# Patient Record
Sex: MALE | Race: WHITE | NOT HISPANIC OR LATINO | Employment: OTHER | URBAN - METROPOLITAN AREA
[De-identification: names, ages, dates, MRNs, and addresses within clinical notes are randomized per-mention and may not be internally consistent; named-entity substitution may affect disease eponyms.]

---

## 2017-01-16 ENCOUNTER — HOSPITAL ENCOUNTER (EMERGENCY)
Facility: HOSPITAL | Age: 72
Discharge: HOME | End: 2017-01-16
Attending: EMERGENCY MEDICINE | Admitting: EMERGENCY MEDICINE

## 2017-01-16 ENCOUNTER — APPOINTMENT (OUTPATIENT)
Dept: CT IMAGING | Facility: HOSPITAL | Age: 72
End: 2017-01-16

## 2017-01-16 VITALS
WEIGHT: 250 LBS | BODY MASS INDEX: 32.08 KG/M2 | HEIGHT: 74 IN | SYSTOLIC BLOOD PRESSURE: 152 MMHG | HEART RATE: 62 BPM | OXYGEN SATURATION: 99 % | DIASTOLIC BLOOD PRESSURE: 85 MMHG | TEMPERATURE: 96.6 F | RESPIRATION RATE: 16 BRPM

## 2017-01-16 DIAGNOSIS — D64.9 CHRONIC ANEMIA: ICD-10-CM

## 2017-01-16 DIAGNOSIS — R55 VASOVAGAL SYNCOPE: Primary | ICD-10-CM

## 2017-01-16 LAB
ALBUMIN SERPL-MCNC: 3.4 G/DL (ref 3.5–5.2)
ALBUMIN/GLOB SERPL: 1.4 G/DL
ALP SERPL-CCNC: 71 U/L (ref 39–117)
ALT SERPL W P-5'-P-CCNC: 18 U/L (ref 1–41)
ANION GAP SERPL CALCULATED.3IONS-SCNC: 12 MMOL/L
AST SERPL-CCNC: 16 U/L (ref 1–40)
BASOPHILS # BLD AUTO: 0.01 10*3/MM3 (ref 0–0.2)
BASOPHILS NFR BLD AUTO: 0.2 % (ref 0–1.5)
BILIRUB SERPL-MCNC: 0.7 MG/DL (ref 0.1–1.2)
BUN BLD-MCNC: 27 MG/DL (ref 8–23)
BUN/CREAT SERPL: 22.9 (ref 7–25)
CALCIUM SPEC-SCNC: 9 MG/DL (ref 8.6–10.5)
CHLORIDE SERPL-SCNC: 105 MMOL/L (ref 98–107)
CO2 SERPL-SCNC: 24 MMOL/L (ref 22–29)
CREAT BLD-MCNC: 1.18 MG/DL (ref 0.76–1.27)
DEPRECATED RDW RBC AUTO: 53.3 FL (ref 37–54)
EOSINOPHIL # BLD AUTO: 0.03 10*3/MM3 (ref 0–0.7)
EOSINOPHIL NFR BLD AUTO: 0.7 % (ref 0.3–6.2)
ERYTHROCYTE [DISTWIDTH] IN BLOOD BY AUTOMATED COUNT: 16.1 % (ref 11.5–14.5)
GFR SERPL CREATININE-BSD FRML MDRD: 61 ML/MIN/1.73
GLOBULIN UR ELPH-MCNC: 2.4 GM/DL
GLUCOSE BLD-MCNC: 178 MG/DL (ref 65–99)
HCT VFR BLD AUTO: 31.8 % (ref 40.4–52.2)
HGB BLD-MCNC: 9.6 G/DL (ref 13.7–17.6)
IMM GRANULOCYTES # BLD: 0 10*3/MM3 (ref 0–0.03)
IMM GRANULOCYTES NFR BLD: 0 % (ref 0–0.5)
LYMPHOCYTES # BLD AUTO: 0.68 10*3/MM3 (ref 0.9–4.8)
LYMPHOCYTES NFR BLD AUTO: 16.7 % (ref 19.6–45.3)
MCH RBC QN AUTO: 28.7 PG (ref 27–32.7)
MCHC RBC AUTO-ENTMCNC: 30.2 G/DL (ref 32.6–36.4)
MCV RBC AUTO: 95.2 FL (ref 79.8–96.2)
MONOCYTES # BLD AUTO: 0.15 10*3/MM3 (ref 0.2–1.2)
MONOCYTES NFR BLD AUTO: 3.7 % (ref 5–12)
NEUTROPHILS # BLD AUTO: 3.21 10*3/MM3 (ref 1.9–8.1)
NEUTROPHILS NFR BLD AUTO: 78.7 % (ref 42.7–76)
NRBC BLD MANUAL-RTO: 0 /100 WBC (ref 0–0)
PLATELET # BLD AUTO: 72 10*3/MM3 (ref 140–500)
PMV BLD AUTO: 10.2 FL (ref 6–12)
POTASSIUM BLD-SCNC: 4.5 MMOL/L (ref 3.5–5.2)
PROT SERPL-MCNC: 5.8 G/DL (ref 6–8.5)
RBC # BLD AUTO: 3.34 10*6/MM3 (ref 4.6–6)
SODIUM BLD-SCNC: 141 MMOL/L (ref 136–145)
TROPONIN T SERPL-MCNC: <0.01 NG/ML (ref 0–0.03)
TROPONIN T SERPL-MCNC: <0.01 NG/ML (ref 0–0.03)
WBC NRBC COR # BLD: 4.08 10*3/MM3 (ref 4.5–10.7)

## 2017-01-16 PROCEDURE — 70450 CT HEAD/BRAIN W/O DYE: CPT

## 2017-01-16 PROCEDURE — 84484 ASSAY OF TROPONIN QUANT: CPT | Performed by: EMERGENCY MEDICINE

## 2017-01-16 PROCEDURE — 93005 ELECTROCARDIOGRAM TRACING: CPT | Performed by: EMERGENCY MEDICINE

## 2017-01-16 PROCEDURE — 93010 ELECTROCARDIOGRAM REPORT: CPT | Performed by: INTERNAL MEDICINE

## 2017-01-16 PROCEDURE — 80053 COMPREHEN METABOLIC PANEL: CPT | Performed by: EMERGENCY MEDICINE

## 2017-01-16 PROCEDURE — 99285 EMERGENCY DEPT VISIT HI MDM: CPT

## 2017-01-16 PROCEDURE — 85025 COMPLETE CBC W/AUTO DIFF WBC: CPT | Performed by: EMERGENCY MEDICINE

## 2017-01-16 RX ORDER — SODIUM CHLORIDE 0.9 % (FLUSH) 0.9 %
10 SYRINGE (ML) INJECTION AS NEEDED
Status: DISCONTINUED | OUTPATIENT
Start: 2017-01-16 | End: 2017-01-16 | Stop reason: HOSPADM

## 2017-01-16 NOTE — DISCHARGE INSTRUCTIONS
Follow-up with Dr. Guerrero later this week.  Drink plenty of fluids.  Return to the emergency department for chest pain, shortness breath, fainting, dizziness, or other concern.

## 2017-01-16 NOTE — ED PROVIDER NOTES
EMERGENCY DEPARTMENT ENCOUNTER    CHIEF COMPLAINT  Chief Complaint: Near Syncope  History given by: Patient  History limited by: Nothing  Room Number: 17/17  PMD: Barry Guerrero MD      HPI:  Pt is a 71 y.o. male who presents s/p two near syncopal episodes that occurred about 1100 at [facility]. He states that he had just finished eating and when he went to stand, he suddenly became very dizzy. The patient immediately sat down and took a nitro. 5 minutes later the patient went to leave and he became dizzy again and his vision blurred before he sat back down. He noticed he was very hot and diaphoretic during the second episode. The patient denies chest pain, SOA, pain in his arms or palpitations prior to the near syncopal episode. Patient has also had a productive cough for the past week. He is currently taking an antibiotic, although he has not changed any of his other medications. No other complaints at this time.      Duration:  5-10 minutes  Onset: Sudden  Timing: Episodic  Location: Generalized  Radiation: None  Quality: Weakness  Intensity/Severity: Moderate  Progression: Resolved  Associated Symptoms: Syncope, dizziness, diaphoretic, visual disturbance, cough  Aggravating Factors: None  Alleviating Factors: Time  Previous Episodes: None  Treatment before arrival: Nitro     PAST MEDICAL HISTORY  Active Ambulatory Problems     Diagnosis Date Noted   • Cellulitis of right lower extremity 09/09/2016   • Osteomyelitis 12/08/2016     Resolved Ambulatory Problems     Diagnosis Date Noted   • No Resolved Ambulatory Problems     Past Medical History   Diagnosis Date   • Coronary artery disease    • Diabetes mellitus    • GERD (gastroesophageal reflux disease)    • H/O kidney transplant    • Hyperlipidemia    • Renal failure    • Sleep apnea        PAST SURGICAL HISTORY  Past Surgical History   Procedure Laterality Date   • Coronary angioplasty with stent placement  09/28/2011   • Transplantation renal  11/16/1990   •  Joint replacement  12/2000     hip,    • Nasal sinus surgery     • Amputation foot / toe       second toe on both feet   • Shoulder rotator cuff repair     • Hernia repair       embilical   • Av fistula placement       right arm, removed 10 years ago.   • Pr drain lower leg deep absc/hematoma Right 9/9/2016     Procedure: LOWER EXTREMITY DEBRIDEMENT-ulcer is on bottom of right foot;  Surgeon: Lexis Burgess DO;  Location: [facility] OR;  Service: General   • Amputation digit Right 12/9/2016     Procedure: RESECTION 2ND THROUGH 5TH METATARSAL;  Surgeon: Marin Castañeda DPM;  Location: [facility] OR;  Service:    • Bone excision leg Right 12/14/2016     Procedure: RESECTION OF PARTIAL FIRST RAY RIGHT FOOT WITH SURGICAL WOUND CLOSURE;  Surgeon: Marin Castañeda DPM;  Location: [facility] OR;  Service:        FAMILY HISTORY  Family History   Problem Relation Age of Onset   • Heart disease Mother    • Heart disease Father    • Cancer Brother        SOCIAL HISTORY  Social History     Social History   • Marital status:      Spouse name: N/A   • Number of children: N/A   • Years of education: N/A     Occupational History   • Not on file.     Social History Main Topics   • Smoking status: Former Smoker   • Smokeless tobacco: Never Used      Comment: Quit 30 years ago   • Alcohol use No   • Drug use: No   • Sexual activity: Defer     Other Topics Concern   • Not on file     Social History Narrative   • No narrative on file       ALLERGIES  Sulfa antibiotics and Adhesive tape    REVIEW OF SYSTEMS  Review of Systems   Constitutional: Positive for diaphoresis. Negative for chills and fatigue.   HENT: Negative for congestion, rhinorrhea and sore throat.    Eyes: Positive for visual disturbance. Negative for pain.   Respiratory: Positive for cough. Negative for shortness of breath and wheezing.    Cardiovascular: Negative for chest pain, palpitations and leg swelling.   Gastrointestinal: Negative for abdominal pain, diarrhea,  nausea and vomiting.   Genitourinary: Negative for difficulty urinating, dysuria, flank pain and frequency.   Musculoskeletal: Negative for arthralgias, myalgias, neck pain and neck stiffness.   Skin: Negative for rash.   Neurological: Positive for syncope (Near) and weakness. Negative for dizziness, speech difficulty, light-headedness, numbness and headaches.   Psychiatric/Behavioral: Negative.    All other systems reviewed and are negative.      PHYSICAL EXAM  ED Triage Vitals   Temp Heart Rate Resp BP SpO2   01/16/17 1206 01/16/17 1206 01/16/17 1206 01/16/17 1206 01/16/17 1206   97.8 °F (36.6 °C) 67 16 109/65 98 %      Temp src Heart Rate Source Patient Position BP Location FiO2 (%)   -- -- -- -- --              Physical Exam   Constitutional: He is oriented to person, place, and time and well-developed, well-nourished, and in no distress.   HENT:   Head: Normocephalic and atraumatic.   Eyes: EOM are normal. Pupils are equal, round, and reactive to light.   Neck: Normal range of motion. Neck supple.   Cardiovascular: Normal rate, regular rhythm and normal heart sounds.    Pulmonary/Chest: Effort normal and breath sounds normal. No respiratory distress.   Abdominal: Soft. There is no tenderness. There is no rebound and no guarding.   Musculoskeletal: Normal range of motion. He exhibits no edema.   Neurological: He is alert and oriented to person, place, and time. He has normal sensation and normal strength.   Skin: Skin is warm and dry.   1+ edema ankles   Psychiatric: Mood and affect normal.   Nursing note and vitals reviewed.      LAB RESULTS  Lab Results (last 24 hours)     Procedure Component Value Units Date/Time    CBC & Differential [[id number]] Collected:  01/16/17 1310    Specimen:  Blood Updated:  01/16/17 1323    Narrative:       The following orders were created for panel order CBC & Differential.  Procedure                               Abnormality         Status                     ---------                                -----------         ------                     CBC Auto Differential[[id number]]         Abnormal            Final result                 Please view results for these tests on the individual orders.    Comprehensive Metabolic Panel [[id number]]  (Abnormal) Collected:  01/16/17 1310    Specimen:  Blood Updated:  01/16/17 1344     Glucose 178 (H) mg/dL      BUN 27 (H) mg/dL      Creatinine 1.18 mg/dL      Sodium 141 mmol/L      Potassium 4.5 mmol/L      Chloride 105 mmol/L      CO2 24.0 mmol/L      Calcium 9.0 mg/dL      Total Protein 5.8 (L) g/dL      Albumin 3.40 (L) g/dL      ALT (SGPT) 18 U/L      AST (SGOT) 16 U/L      Alkaline Phosphatase 71 U/L      Total Bilirubin 0.7 mg/dL      eGFR Non African Amer 61 mL/min/1.73      Globulin 2.4 gm/dL      A/G Ratio 1.4 g/dL      BUN/Creatinine Ratio 22.9      Anion Gap 12.0 mmol/L     Narrative:       The MDRD GFR formula is only valid for adults with stable renal function between ages 18 and 70.    Troponin [[id number]]  (Normal) Collected:  01/16/17 1310    Specimen:  Blood Updated:  01/16/17 1344     Troponin T <0.010 ng/mL     Narrative:       Troponin T Reference Ranges:  Less than 0.03 ng/mL:    Negative for AMI  0.03 to 0.09 ng/mL:      Indeterminant for AMI  Greater than 0.09 ng/mL: Positive for AMI    CBC Auto Differential [[id number]]  (Abnormal) Collected:  01/16/17 1310    Specimen:  Blood Updated:  01/16/17 1323     WBC 4.08 (L) 10*3/mm3      RBC 3.34 (L) 10*6/mm3      Hemoglobin 9.6 (L) g/dL      Hematocrit 31.8 (L) %      MCV 95.2 fL      MCH 28.7 pg      MCHC 30.2 (L) g/dL      RDW 16.1 (H) %      RDW-SD 53.3 fl      MPV 10.2 fL      Platelets 72 (L) 10*3/mm3      Neutrophil % 78.7 (H) %      Lymphocyte % 16.7 (L) %      Monocyte % 3.7 (L) %      Eosinophil % 0.7 %      Basophil % 0.2 %      Immature Grans % 0.0 %      Neutrophils, Absolute 3.21 10*3/mm3      Lymphocytes, Absolute 0.68 (L) 10*3/mm3      Monocytes, Absolute 0.15 (L) 10*3/mm3       Eosinophils, Absolute 0.03 10*3/mm3      Basophils, Absolute 0.01 10*3/mm3      Immature Grans, Absolute 0.00 10*3/mm3      nRBC 0.0 /100 WBC     Troponin [[id number]]  (Normal) Collected:  01/16/17 1505    Specimen:  Blood Updated:  01/16/17 1539     Troponin T <0.010 ng/mL     Narrative:       Troponin T Reference Ranges:  Less than 0.03 ng/mL:    Negative for AMI  0.03 to 0.09 ng/mL:      Indeterminant for AMI  Greater than 0.09 ng/mL: Positive for AMI          I ordered the above labs and reviewed the results    RADIOLOGY  CT Head Without Contrast   Preliminary Result   No convincing acute abnormality is seen. 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 The remainder of the head CT is   within normal limits. If there is persistent vertigo and dizziness and   one is concerned about the possibility of an acute posterior fossa   stroke, an MRI of the head could be obtained for more complete   assessment.       The results were communicated to Dr. Maxim Abreu in the emergency room   by telephone 01/16/2017 at 1:45 PM.                     1351  Reviewed CT head - Nothing acute. 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 Independently viewed by me. Interpreted by radiologist. Discussed with .    I ordered the above noted radiological studies. Interpreted by radiologist. Discussed with radiologist (). Reviewed by me in PACS.       PROCEDURES  Procedures    EKG           EKG time: 1242  Rhythm/Rate: SR 60  P waves and NM: Normal  QRS, axis: RAD, inferior Q waves   ST and T waves: Normal     Interpreted Contemporaneously by me, independently viewed  Unchanged compared to prior 12/8/16    PROGRESS AND CONSULTS  ED Course   Value Comment By Time   Hemoglobin: (!) 9.6 11.3 one month ago Marin Abreu MD 01/16 1351     1226  Ordered EKG for further evaluation    1253  Ordered Labs  and CT Head for further evaluation. Also ordered IVF for hydration.     1411  Orthostatics are negative.     1417  Rechecked patient and they are resting comfortably. Discussed pertinent lab and imaging results, including negative acute CT, EKG and labs. Discussed the plan to consult with his Cardiologist. Patient agrees with plan and all questions were addressed.     1419  Discussed case with  (Cardiology). He recommended running a repeat troponin. Reviewed history, exam, results and treatments.  Discussed concerns and plan of care.     1437  Rechecked patient and they are resting. Discussed pertinent lab results, including slightly decreased hemoglobin. Discussed the plan to recheck his troponin at 1500. Patient agrees with plan and all questions were addressed.     1540  Repeat troponin was negative.  Patient has ambulated in the ER without difficulty.  He is resting comfortable and has no complaints.  Patient will be discharged.      MEDICAL DECISION MAKING  Results were reviewed/discussed with the patient and they were also made aware of online access. Pt also made aware that some labs, such as cultures, will not be resulted during ER visit and follow up with PMD is necessary.     MDM  Number of Diagnoses or Management Options  Chronic anemia:   Vasovagal syncope:   Diagnosis management comments: Patient was in the ER after having a near-syncopal episode.  Patient had just finished eating a [employer] when he stood up and became dizzy.  He then sat back down into the nitroglycerin.  He denies having any chest pain, shortness of breath, nausea.  After few minutes, instead backup and again felt lightheaded and faint.  He states his vision began to fade out.  He again sat back down.  He never lost consciousness.  Upon arrival to the ER, he complained of mild lightheadedness.  His EKG was unchanged.  His neuro exam was normal.  Head CT showed an old right sided infarct but nothing acute.  Patient is  chronically anemic and his hemoglobin was slightly lower today.  Troponin was negative ×2.  Case was discussed with Dr. Gomez.  The patient was not orthostatic.  He will be discharged and advised follow-up with his regular doctor.  Patient was able ambulate in the ER without difficulty.       Amount and/or Complexity of Data Reviewed  Clinical lab tests: ordered and reviewed  Tests in the radiology section of CPT®: ordered and reviewed  Tests in the medicine section of CPT®: ordered and reviewed  Discussion of test results with the performing providers: yes  Decide to obtain previous medical records or to obtain history from someone other than the patient: yes  Review and summarize past medical records: yes (Admitted [facility] 12/11 - 12-22 for right foot cellulitis, osteomyelitis and right foot ulcer. Underwent resection of part first ray of right foot. H/o rental transplant. )           DIAGNOSIS  Final diagnoses:   Vasovagal syncope   Chronic anemia       DISPOSITION  discharged    Latest Documented Vital Signs:  As of 3:42 PM  BP- 152/77 HR- 64 Temp- 97.8 °F (36.6 °C) O2 sat- 99%    --  Documentation assistance provided by shruthi Sharif for .  Information recorded by the scribarnav was done at my direction and has been verified and validated by me.            Adin Sharif  01/16/17 7811       Marin Abreu MD  01/16/17 3805

## 2017-01-16 NOTE — ED NOTES
Pt had near syncopal event at [facility]. Pt dev like visual field was narrowing in and he became diaphoretic. Symptoms started when he went from sitting to standing. Pt took 1 nitro and symptoms went away. Pt is asymptomatic at this time.     Julisa Altamirano RN  01/16/17 3805

## 2017-03-13 DIAGNOSIS — M25.571 ACUTE RIGHT ANKLE PAIN: Primary | ICD-10-CM

## 2017-03-14 ENCOUNTER — HOSPITAL ENCOUNTER (OUTPATIENT)
Dept: GENERAL RADIOLOGY | Facility: HOSPITAL | Age: 72
Discharge: HOME | End: 2017-03-14
Attending: PODIATRIST | Admitting: PODIATRIST

## 2017-03-14 PROCEDURE — 73610 X-RAY EXAM OF ANKLE: CPT

## 2017-09-22 ENCOUNTER — TRANSCRIBE ORDERS (OUTPATIENT)
Dept: ADMINISTRATIVE | Facility: HOSPITAL | Age: 72
End: 2017-09-22

## 2017-09-22 ENCOUNTER — HOSPITAL ENCOUNTER (OUTPATIENT)
Dept: GENERAL RADIOLOGY | Facility: HOSPITAL | Age: 72
Discharge: HOME | End: 2017-09-22
Attending: PODIATRIST | Admitting: PODIATRIST

## 2017-09-22 DIAGNOSIS — L97.521 ULCER OF TOE OF LEFT FOOT, LIMITED TO BREAKDOWN OF SKIN (HCC): Primary | ICD-10-CM

## 2017-09-22 DIAGNOSIS — L97.521 ULCER OF TOE OF LEFT FOOT, LIMITED TO BREAKDOWN OF SKIN (HCC): ICD-10-CM

## 2017-09-22 PROCEDURE — 73630 X-RAY EXAM OF FOOT: CPT

## 2018-02-26 ENCOUNTER — HOSPITAL ENCOUNTER (EMERGENCY)
Facility: HOSPITAL | Age: 73
Discharge: HOME | End: 2018-02-26
Attending: EMERGENCY MEDICINE | Admitting: EMERGENCY MEDICINE

## 2018-02-26 VITALS
HEART RATE: 82 BPM | WEIGHT: 260 LBS | DIASTOLIC BLOOD PRESSURE: 78 MMHG | HEIGHT: 74 IN | RESPIRATION RATE: 20 BRPM | SYSTOLIC BLOOD PRESSURE: 136 MMHG | TEMPERATURE: 97.9 F | OXYGEN SATURATION: 98 % | BODY MASS INDEX: 33.37 KG/M2

## 2018-02-26 DIAGNOSIS — S80.12XA TRAUMATIC HEMATOMA OF LEFT LOWER LEG, INITIAL ENCOUNTER: Primary | ICD-10-CM

## 2018-02-26 DIAGNOSIS — S80.812A ABRASION, LEFT LOWER LEG, INITIAL ENCOUNTER: ICD-10-CM

## 2018-02-26 PROCEDURE — 25010000002 TDAP 5-2.5-18.5 LF-MCG/0.5 SUSPENSION: Performed by: EMERGENCY MEDICINE

## 2018-02-26 PROCEDURE — 99283 EMERGENCY DEPT VISIT LOW MDM: CPT

## 2018-02-26 PROCEDURE — 90715 TDAP VACCINE 7 YRS/> IM: CPT | Performed by: EMERGENCY MEDICINE

## 2018-02-26 PROCEDURE — 99282 EMERGENCY DEPT VISIT SF MDM: CPT | Performed by: EMERGENCY MEDICINE

## 2018-02-26 PROCEDURE — 90471 IMMUNIZATION ADMIN: CPT | Performed by: EMERGENCY MEDICINE

## 2018-02-26 RX ORDER — ATORVASTATIN CALCIUM 10 MG/1
10 TABLET, FILM COATED ORAL DAILY
COMMUNITY

## 2018-02-26 RX ADMIN — MUPIROCIN 1 APPLICATION: 20 OINTMENT TOPICAL at 20:09

## 2018-02-26 RX ADMIN — TETANUS TOXOID, REDUCED DIPHTHERIA TOXOID AND ACELLULAR PERTUSSIS VACCINE, ADSORBED 0.5 ML: 5; 2.5; 8; 8; 2.5 SUSPENSION INTRAMUSCULAR at 20:17

## 2019-01-12 ENCOUNTER — LAB REQUISITION (OUTPATIENT)
Dept: LAB | Facility: HOSPITAL | Age: 74
End: 2019-01-12

## 2019-01-12 DIAGNOSIS — T83.512A: ICD-10-CM

## 2019-01-12 DIAGNOSIS — A41.51 SEPSIS DUE TO ESCHERICHIA COLI (E. COLI) (HCC): ICD-10-CM

## 2019-01-12 LAB
ALBUMIN SERPL-MCNC: 3.4 G/DL (ref 3.5–5.2)
ALBUMIN/GLOB SERPL: 1.1 G/DL
ALP SERPL-CCNC: 80 U/L (ref 40–129)
ALT SERPL W P-5'-P-CCNC: 21 U/L (ref 5–41)
ANION GAP SERPL CALCULATED.3IONS-SCNC: 10.4 MMOL/L
AST SERPL-CCNC: 17 U/L (ref 5–40)
BASOPHILS # BLD AUTO: 0.03 10*3/MM3 (ref 0–0.2)
BASOPHILS NFR BLD AUTO: 0.6 % (ref 0–2)
BILIRUB SERPL-MCNC: 0.6 MG/DL (ref 0.2–1.2)
BUN BLD-MCNC: 19 MG/DL (ref 8–23)
BUN/CREAT SERPL: 21.3 (ref 7–25)
CALCIUM SPEC-SCNC: 9 MG/DL (ref 8.8–10.5)
CHLORIDE SERPL-SCNC: 106 MMOL/L (ref 98–107)
CO2 SERPL-SCNC: 28.6 MMOL/L (ref 22–29)
CREAT BLD-MCNC: 0.89 MG/DL (ref 0.76–1.27)
CRP SERPL-MCNC: 1.07 MG/DL (ref 0–0.5)
DEPRECATED RDW RBC AUTO: 48.8 FL (ref 37–54)
EOSINOPHIL # BLD AUTO: 0.09 10*3/MM3 (ref 0.1–0.3)
EOSINOPHIL NFR BLD AUTO: 1.9 % (ref 0–4)
ERYTHROCYTE [DISTWIDTH] IN BLOOD BY AUTOMATED COUNT: 14 % (ref 11.5–14.5)
ERYTHROCYTE [SEDIMENTATION RATE] IN BLOOD: 17 MM/HR (ref 0–20)
GFR SERPL CREATININE-BSD FRML MDRD: 84 ML/MIN/1.73
GLOBULIN UR ELPH-MCNC: 3.2 GM/DL
GLUCOSE BLD-MCNC: 100 MG/DL (ref 65–99)
HCT VFR BLD AUTO: 41.1 % (ref 42–52)
HGB BLD-MCNC: 12.7 G/DL (ref 14–18)
IMM GRANULOCYTES # BLD AUTO: 0.01 10*3/MM3 (ref 0–0.03)
IMM GRANULOCYTES NFR BLD AUTO: 0.2 % (ref 0–0.5)
LYMPHOCYTES # BLD AUTO: 1.62 10*3/MM3 (ref 0.6–4.8)
LYMPHOCYTES NFR BLD AUTO: 34.9 % (ref 20–45)
MCH RBC QN AUTO: 29.1 PG (ref 27–31)
MCHC RBC AUTO-ENTMCNC: 30.9 G/DL (ref 31–37)
MCV RBC AUTO: 94.1 FL (ref 80–94)
MONOCYTES # BLD AUTO: 0.31 10*3/MM3 (ref 0–1)
MONOCYTES NFR BLD AUTO: 6.7 % (ref 3–8)
NEUTROPHILS # BLD AUTO: 2.58 10*3/MM3 (ref 1.5–8.3)
NEUTROPHILS NFR BLD AUTO: 55.7 % (ref 45–70)
NRBC BLD AUTO-RTO: 0 /100 WBC (ref 0–0)
PLATELET # BLD AUTO: 165 10*3/MM3 (ref 140–500)
PMV BLD AUTO: 10.5 FL (ref 7.4–10.4)
POTASSIUM BLD-SCNC: 3.9 MMOL/L (ref 3.5–5.2)
PROT SERPL-MCNC: 6.6 G/DL (ref 6–8.5)
RBC # BLD AUTO: 4.37 10*6/MM3 (ref 4.7–6.1)
SODIUM BLD-SCNC: 145 MMOL/L (ref 136–145)
WBC NRBC COR # BLD: 4.64 10*3/MM3 (ref 4.8–10.8)

## 2019-01-12 PROCEDURE — 80053 COMPREHEN METABOLIC PANEL: CPT | Performed by: INTERNAL MEDICINE

## 2019-01-12 PROCEDURE — 36415 COLL VENOUS BLD VENIPUNCTURE: CPT | Performed by: INTERNAL MEDICINE

## 2019-01-12 PROCEDURE — 85652 RBC SED RATE AUTOMATED: CPT | Performed by: INTERNAL MEDICINE

## 2019-01-12 PROCEDURE — 86140 C-REACTIVE PROTEIN: CPT | Performed by: INTERNAL MEDICINE

## 2019-01-12 PROCEDURE — 85025 COMPLETE CBC W/AUTO DIFF WBC: CPT | Performed by: INTERNAL MEDICINE

## 2019-01-17 ENCOUNTER — HOSPITAL ENCOUNTER (OUTPATIENT)
Dept: OTHER | Facility: HOSPITAL | Age: 74
Setting detail: SPECIMEN
Discharge: HOME | End: 2019-01-17
Attending: UROLOGY | Admitting: UROLOGY

## 2019-01-18 LAB — SPECIMEN SOURCE: NORMAL

## 2019-03-19 ENCOUNTER — INPATIENT HOSPITAL (OUTPATIENT)
Dept: URBAN - METROPOLITAN AREA HOSPITAL 107 | Facility: HOSPITAL | Age: 74
End: 2019-03-19

## 2019-03-19 DIAGNOSIS — R10.30 LOWER ABDOMINAL PAIN, UNSPECIFIED: ICD-10-CM

## 2019-03-19 DIAGNOSIS — R11.2 NAUSEA WITH VOMITING, UNSPECIFIED: ICD-10-CM

## 2019-03-19 PROCEDURE — 99222 1ST HOSP IP/OBS MODERATE 55: CPT | Performed by: INTERNAL MEDICINE

## 2019-10-31 ENCOUNTER — HOSPITAL ENCOUNTER (EMERGENCY)
Facility: HOSPITAL | Age: 74
Discharge: HOME | End: 2019-10-31
Attending: EMERGENCY MEDICINE | Admitting: EMERGENCY MEDICINE

## 2019-10-31 VITALS
HEIGHT: 74 IN | OXYGEN SATURATION: 97 % | DIASTOLIC BLOOD PRESSURE: 112 MMHG | SYSTOLIC BLOOD PRESSURE: 163 MMHG | RESPIRATION RATE: 16 BRPM | WEIGHT: 250 LBS | TEMPERATURE: 98.3 F | HEART RATE: 71 BPM | BODY MASS INDEX: 32.08 KG/M2

## 2019-10-31 DIAGNOSIS — E16.2 HYPOGLYCEMIA: Primary | ICD-10-CM

## 2019-10-31 LAB
GLUCOSE BLDC GLUCOMTR-MCNC: 109 MG/DL (ref 70–130)
GLUCOSE BLDC GLUCOMTR-MCNC: 73 MG/DL (ref 70–130)
GLUCOSE BLDC GLUCOMTR-MCNC: 79 MG/DL (ref 70–130)
GLUCOSE BLDC GLUCOMTR-MCNC: 98 MG/DL (ref 70–130)

## 2019-10-31 PROCEDURE — 99282 EMERGENCY DEPT VISIT SF MDM: CPT | Performed by: EMERGENCY MEDICINE

## 2019-10-31 PROCEDURE — 99283 EMERGENCY DEPT VISIT LOW MDM: CPT

## 2019-10-31 PROCEDURE — 82962 GLUCOSE BLOOD TEST: CPT

## 2022-10-24 ENCOUNTER — TRANSCRIBE ORDERS (OUTPATIENT)
Dept: PHYSICAL THERAPY | Facility: HOSPITAL | Age: 77
End: 2022-10-24

## 2022-10-24 DIAGNOSIS — I89.0 LYMPHEDEMA: Primary | ICD-10-CM

## 2022-11-01 ENCOUNTER — HOSPITAL ENCOUNTER (OUTPATIENT)
Dept: OCCUPATIONAL THERAPY | Facility: HOSPITAL | Age: 77
Setting detail: THERAPIES SERIES
Discharge: HOME | End: 2022-11-01

## 2022-11-01 DIAGNOSIS — I89.0 LYMPHEDEMA OF UPPER EXTREMITY: Primary | ICD-10-CM

## 2022-11-01 DIAGNOSIS — C4A.61 MERKEL CELL CARCINOMA OF RIGHT UPPER EXTREMITY: ICD-10-CM

## 2022-11-01 PROCEDURE — 97166 OT EVAL MOD COMPLEX 45 MIN: CPT

## 2022-11-01 PROCEDURE — 97535 SELF CARE MNGMENT TRAINING: CPT

## 2022-11-01 NOTE — THERAPY EVALUATION
Outpatient Occupational Therapy Lymphedema Initial Evaluation  [facility]     Patient Name: Matt Hopson  : 1945  MRN: [id number]  Today's Date: 2022      Visit Date: 2022    Patient Active Problem List   Diagnosis   • Cellulitis of right lower extremity   • Osteomyelitis (HCC)        Past Medical History:   Diagnosis Date   • Coronary artery disease    • Diabetes mellitus (HCC)    • GERD (gastroesophageal reflux disease)    • H/O kidney transplant    • Hyperlipidemia    • Renal failure    • Sleep apnea         Past Surgical History:   Procedure Laterality Date   • AMPUTATION DIGIT Right 2016    Procedure: RESECTION 2ND THROUGH 5TH METATARSAL;  Surgeon: Marin Castañeda DPM;  Location: [facility] OR;  Service:    • AMPUTATION FOOT / TOE      second toe on both feet   • ARTERIOVENOUS FISTULA      right arm, removed 10 years ago.   • BONE EXCISION LEG Right 2016    Procedure: RESECTION OF PARTIAL FIRST RAY RIGHT FOOT WITH SURGICAL WOUND CLOSURE;  Surgeon: Marin Castañeda DPM;  Location: [facility] OR;  Service:    • CORONARY ANGIOPLASTY WITH STENT PLACEMENT  2011   • HERNIA REPAIR      embilical   • JOINT REPLACEMENT  2000    hip,    • NASAL SINUS SURGERY     • [place] DRAIN LOWER LEG DEEP ABSC/HEMATOMA Right 2016    Procedure: LOWER EXTREMITY DEBRIDEMENT-ulcer is on bottom of right foot;  Surgeon: Lexis Burgess DO;  Location: [facility] OR;  Service: General   • SHOULDER ROTATOR CUFF REPAIR     • TRANSPLANTATION RENAL  1990         Visit Dx:     ICD-10-CM ICD-9-CM   1. Lymphedema of upper extremity  I89.0 457.1   2. 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 His last drain was removed yesterday. He is  having some mild edema in his right arm but reports it is better than it was earlier.  -RE      Patient/Caregiver Goals Decrease swelling;Know what to do to help the symptoms;Return to prior level of function  -RE      Surgery/Hospitalization kidney transplant  -RE         Pain     Pain at Present 0  -RE         Fall Risk Assessment    Any falls in the past year: Yes  -RE      Number of falls reported in the last 12 months 1  -RE      Factors that contributed to the fall: Tripped  -RE      Previous Functional Level --  uses cane or walker  -RE         Services    Are you currently receiving Home Health services No  -RE         Daily Activities    Primary Language English  -RE      How does patient learn best? Listening  -RE      Teaching needs identified Home Exercise Program;Management of Condition  -RE      Patient is concerned about/has problems with Other (comment)  swelling  -RE      Does patient have problems with the following? None  -RE      Barriers to learning None  -RE      Pt Participated in POC and Goals Yes  -RE         Safety    Are you being hurt, hit, or frightened by anyone at home or in your life? No  -RE      Are you being neglected by a caregiver No  -RE      Have you had any of the following issues with N/A  -RE            User Key  (r) = Recorded By, (t) = Taken By, (c) = Cosigned By    Initials Name Provider Type    RE Sandra Boateng OTR Occupational Therapist                 Lymphedema     Row Name 11/01/22 1300             Subjective Pain    Able to rate subjective pain? yes  -RE      Pre-Treatment Pain Level 0  -RE      Post-Treatment Pain Level 0  -RE         Lymphedema Assessment    Lymphedema Classification RUE:  -RE      Lymphedema Cancer Related Sx right;axillary dissection  -RE      Lymphedema Surgery Comments excision of merkel cell CA on R forearm  -RE      Lymph Nodes Removed # 35  -RE      Positive Lymph Nodes # 26  -RE      Chemo Received --  TBD  -RE      Radiation Therapy  "Received --  planned-6 weeks  -RE      Infections or Cellulitis? yes  infection around drain.  -RE      Infection/Cellulitis Treatment oral antibiotics  -RE         LLIS - Physical Concerns    The amount of pain associated with my lymphedema is: 0  -RE      The amount of limb heaviness associated with my lymphedema is: 1  -RE      The amount of skin tightness associated with my lymphedema is: 0  -RE      The size of my swollen limb(s) seems: 1  -RE      Lymphedema affects the movement of my swollen limb(s): 1  -RE      The strength in my swollen limb(s) is: 0  -RE         LLIS - Psychosocial Concerns    Lymphedema affects my body image (i.e., \"how I think I look\"). 0  -RE      Lymphedema affects my socializing with others. 0  -RE      Lymphedema affects my intimate relations with spouse or partner (rate 0 if not applicable 0  -RE      Lymphedema \"gets me down\" (i.e., depression, frustration, or anger) 0  -RE      I must rely on others for help due to my lymphedema. 1  -RE      I know what to do to manage my lymphedema 4  -RE         LLIS - Functional Concerns    Lymphedema affects my ability to perform self-care activities (i.e. eating, dressing, hygiene) 0  -RE      Lymphedema affects my ability to perform routine home or work-related activities. 2  -RE      Lymphedema affects my performance of preferred leisure activities. 0  -RE      Lymphedema affects proper fit of clothing/shoes 0  -RE      Lymphedema affects my sleep 1  -RE         Posture/Observations    Observations Ecchymosis/bruising;Incision healing  slight drainage from drain site  -RE         General ROM    GENERAL ROM COMMENTS deferred full eval until drain site healed. Shoulder flexion to 90 w/o pain  -RE         Lymphedema Edema Assessment    Ptting Edema Category By grade out of 4  -RE      Pitting Edema + 2/4;Mild  -RE      Edema Assessment Comment fingers to axilla  -RE         Skin Changes/Observations    Skin Observations Comment forearm scar " well healed. Axillary incison is closed. 1 drain site is open with slight drainage.  -RE         Lymphedema Measurements    Measurement Type(s) Circumferential  -RE      Circumferential Areas Upper extremities  -RE         BUE Circumferential (cm)    Measurement Location 1 Axilla  -RE      Left 1 34.5 cm  -RE      Right 1 35.5 cm  -RE      Measurement Location 2 15 cm above elbow  -RE      Left 2 29 cm  -RE      Right 2 31 cm  -RE      Measurement Location 3 10 cm above elbow  -RE      Left 3 28 cm  -RE      Right 3 29 cm  -RE      Measurement Location 4 5 cm above elbow  -RE      Left 4 29.5 cm  -RE      Right 4 28.5 cm  -RE      Measurement Location 5 elbow  -RE      Left 5 27 cm  -RE      Right 5 29.5 cm  -RE      Measurement Location 6 5 cm below elbow  -RE      Left 6 26.5 cm  -RE      Right 6 28.5 cm  -RE      Measurement Location 7 10 cm below elbow  -RE      Left 7 26 cm  -RE      Right 7 27 cm  -RE      Measurement Location 8 15 cm below elbow  -RE      Left 8 22.8 cm  -RE      Right 8 22.5 cm  -RE      Measurement Location 9 20 cm below elbow  -RE      Left 9 19.8 cm  -RE      Right 9 19.8 cm  -RE      Measurement Location 10 Wrist  -RE      Left 10 19 cm  -RE      Right 10 19.3 cm  -RE      Measurement Location 11 Mid palm  -RE      Left 11 21.5 cm  -RE      Right 11 21.5 cm  -RE      LUE Circumferential Total 283.6 cm  -RE      RUE Circumferential Total 292.1 cm  -RE         Compression/Skin Care    Compression/Skin Care Comments measured for Juzo 3511 sleeve size V max and glove size 5 3021  -RE         L-Dex Bioimpedence Screening    L-Dex UE Comment deferred due to obvious edema  -RE         Lymphedema Life Impact Scale Totals    A.  Total Q1 - Q17 (Do not include Q18) 11  -RE      B.  Total number of questions answered (Q1-Q17) 17  -RE      C. Divide A by B 0.65  -RE      D. Multiple C by 25 16.25  -RE            User Key  (r) = Recorded By, (t) = Taken By, (c) = Cosigned By    Initials Name Provider  Type    RE Sandra Boateng, BURAKR Occupational Therapist                        Therapy Education  Education Details: Discussed lymphedema precautions and gave written information.  Recommended compression sleeve use during the day. Pt will order online.  Instructed in use and care of compression sleeve including donning and doffing. Refrain from overhead reaching for 1 week unless otherwise instructed by MD.  Given: Edema management, Symptoms/condition management  Program: New  How Provided: Verbal, Demonstration, Written  Provided to: Patient  Level of Understanding: Teach back education performed, Verbalized  05115 - OT Self Care/Mgmt Minutes: 30         OT Goals     Row Name 11/01/22 1700          OT Short Term Goals    STG Date to Achieve 11/15/22  -RE     STG 1 Patient will demonstrate proper awareness of “What is Lymphedema?” and “Healthy Habits” for improved prevention, management, care of symptoms and ease of transition to self-care of condition.  -RE     STG 1 Progress New  -RE     STG 2 Patient will demonstrate decreased net edema of right upper extremity >/= 3-5 cm for decrease in symptoms, decreased risk of infection and improved skin care/transition to self-care of condition.  -RE     STG 2 Progress New  -RE        Long Term Goals    LTG Date to Achieve 11/29/22  -RE     LTG 1 Patient independent and compliant with initial home exercise program focused on gentle AROM and stretching to improve AROM to pre-surgical level.  -RE     LTG 1 Progress New  -RE     LTG 2 Patient independent and compliant with compression garments and night compression as indicated for self-management of lymphedema.  -RE     LTG 2 Progress New  -RE     LTG 3 Patient will demonstrate good understanding of post radiation skin care/massage.  -RE     LTG 3 Progress New  -RE     LTG 4 Patient to improve LLIS score by 5 points in 4 weeks.  -RE     LTG 4 Progress New  -RE        Time Calculation    OT Goal Re-Cert Due Date 02/01/23  -RE            User Key  (r) = Recorded By, (t) = Taken By, (c) = Cosigned By    Initials Name Provider Type    RE Sandra Boateng OTR Occupational Therapist                 OT Assessment/Plan     Row Name 11/01/22 1758          OT Assessment    Impairments Edema;Impaired lymphatic circulation;Range of motion  -RE     Assessment Comments Matt Hopson is a 77 y.o. male recently diagnosed with Right UE Merkel cell CA, presents to therapy in evolving condition, s/p Right axillary dissection.   Matt Hopson is at increased risk of lymphedema  Right Upper Extremity due to Radiation therapy Lymph Node Removal Lymph Node involvement Age Diabetes . He presents with post-operative decreased range of motion and R UE edema from fingers to axilla.  Matt Hopson will benefit from skilled, formal  Occupational Therapy at this time to address listed dysfunctions.  -RE     Please refer to paper survey for additional self-reported information Yes  -RE     OT Diagnosis R UE lymphedema  -RE     OT Rehab Potential Good  -RE     Patient/caregiver participated in establishment of treatment plan and goals Yes  -RE     Patient would benefit from skilled therapy intervention Yes  -RE        OT Plan    OT Frequency Other (comment)  -RE     Predicted Duration of Therapy Intervention (OT) Follow up for ROM and garment check 1X; If symptoms are controlled will follow up 4 weeks post rad and then every 1-3 months. If edema increases edel reassess and establish a new POC to include Complete Decngestive Therapy.  -RE     Planned CPT's? OT EVAL MOD COMPLEXITY: 80225;OT THER PROC EA 15 MIN: 34656MH;OT SELF CARE/MGMT/TRAIN 15 MIN: 39755;OT MANUAL THERAPY EA 15 MIN: 20591;OT BIS XTRACELL FLUID ANALYSIS: 38909  -RE     Planned Therapy Interventions (Optional Details) home exercise program;manual therapy techniques;patient/family education;ROM (Range of Motion);other (see comments)  Compression bandaging as indicated, bioimpedance as indicated  -RE      OT Plan Comments Follow up in 2 weeks  -RE           User Key  (r) = Recorded By, (t) = Taken By, (c) = Cosigned By    Initials Name Provider Type    Sandra Vasquez OTR Occupational Therapist                          Time Calculation:   OT Start Time: 1310  OT Stop Time: 1410  OT Time Calculation (min): 60 min  Total Timed Code Minutes- OT: 30 minute(s)  Timed Charges  92006 - OT Self Care/Mgmt Minutes: 30  Untimed Charges  OT Eval/Re-eval Minutes: 30  Total Minutes  Timed Charges Total Minutes: 30  Untimed Charges Total Minutes: 30   Total Minutes: 60     Therapy Charges for Today     Code Description Service Date Service Provider Modifiers Qty    33603478788 HC OT SELF CARE/MGMT/TRAIN EA 15 MIN 11/1/2022 Sandra Boateng OTR GO 2    79975254428 HC OT EVAL MOD COMPLEXITY 2 11/1/2022 Sandra Boateng OTR GO 1                    REINIER Leger  11/1/2022

## 2022-11-22 ENCOUNTER — APPOINTMENT (OUTPATIENT)
Dept: OCCUPATIONAL THERAPY | Facility: HOSPITAL | Age: 77
End: 2022-11-22

## 2022-11-29 ENCOUNTER — HOSPITAL ENCOUNTER (OUTPATIENT)
Dept: OCCUPATIONAL THERAPY | Facility: HOSPITAL | Age: 77
Discharge: HOME | End: 2022-11-29
Admitting: INTERNAL MEDICINE

## 2022-11-29 DIAGNOSIS — C4A.61 MERKEL CELL CARCINOMA OF RIGHT UPPER EXTREMITY: ICD-10-CM

## 2022-11-29 DIAGNOSIS — I89.0 LYMPHEDEMA OF UPPER EXTREMITY: Primary | ICD-10-CM

## 2022-11-29 PROCEDURE — 97535 SELF CARE MNGMENT TRAINING: CPT

## 2022-11-29 NOTE — THERAPY PROGRESS REPORT/RE-CERT
Outpatient Occupational Therapy Lymphedema Progress Note  [facility]     Patient Name: Matt Hopson  : 1945  MRN: [id number]  Today's Date: 2022      Visit Date: 2022    Patient Active Problem List   Diagnosis   • Cellulitis of right lower extremity   • Osteomyelitis (HCC)        Past Medical History:   Diagnosis Date   • Coronary artery disease    • Diabetes mellitus (HCC)    • GERD (gastroesophageal reflux disease)    • H/O kidney transplant    • Hyperlipidemia    • Renal failure    • Sleep apnea         Past Surgical History:   Procedure Laterality Date   • AMPUTATION DIGIT Right 2016    Procedure: RESECTION 2ND THROUGH 5TH METATARSAL;  Surgeon: Marin Castañeda DPM;  Location: [facility] OR;  Service:    • AMPUTATION FOOT / TOE      second toe on both feet   • ARTERIOVENOUS FISTULA      right arm, removed 10 years ago.   • BONE EXCISION LEG Right 2016    Procedure: RESECTION OF PARTIAL FIRST RAY RIGHT FOOT WITH SURGICAL WOUND CLOSURE;  Surgeon: Marin Castañeda DPM;  Location: [facility] OR;  Service:    • CORONARY ANGIOPLASTY WITH STENT PLACEMENT  2011   • HERNIA REPAIR      embilical   • JOINT REPLACEMENT  2000    hip,    • NASAL SINUS SURGERY     • [place] DRAIN LOWER LEG DEEP ABSC/HEMATOMA Right 2016    Procedure: LOWER EXTREMITY DEBRIDEMENT-ulcer is on bottom of right foot;  Surgeon: Lexis Burgess DO;  Location: [facility] OR;  Service: General   • SHOULDER ROTATOR CUFF REPAIR     • TRANSPLANTATION RENAL  1990         Visit Dx:      ICD-10-CM ICD-9-CM   1. Lymphedema of upper extremity  I89.0 457.1   2. 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 yes  -RE      Pre-Treatment Pain Level 0  -RE      Post-Treatment Pain Level 0  -RE         Subjective Comments    Subjective Comments reports that glove is not comfortable  -RE             User Key  (r) = Recorded By, (t) = Taken By, (c) = Cosigned By    Initials Name Provider Type    Sandra Vasquez OTR Occupational Therapist                         OT Assessment/Plan     Row Name 11/29/22 1807          OT Assessment    Impairments Edema;Impaired lymphatic circulation  -RE     Assessment Comments Good fit with compression sleeve, but glove does not fit and is the wrong type and brand. I reviewed use of his products. Edema is stable and surgical wound is closed.  -RE     OT Diagnosis R UE lymhedema  -RE     OT Rehab Potential Good  -RE     Patient/caregiver participated in establishment of treatment plan and goals Yes  -RE     Patient would benefit from skilled therapy intervention Yes  -RE        OT Plan    OT Frequency Other (comment)  -RE     Predicted Duration of Therapy Intervention (OT) follow up in 1 month and reassess for additiional need of treatment  -RE     Planned CPT's? OT THER PROC EA 15 MIN: 60177FN;OT SELF CARE/MGMT/TRAIN 15 MIN: 41692;OT MANUAL THERAPY EA 15 MIN: 76653  -RE     OT Plan Comments Follow up in January for reassessment of edema and post radiation skin care.  -RE           User Key  (r) = Recorded By, (t) = Taken By, (c) = Cosigned By    Initials Name Provider Type    Sandra Vasquez OTR Occupational Therapist                          Therapy Education  Education Details: Gave patient ordering information for the glove. Somehow he either ordered the wrong thng or they sent the wrong product.  Given: Edema management  Program: Reinforced  How Provided: Written, Demonstration  Provided to: Patient  Level of Understanding: Teach back education performed, Verbalized  97022 - OT Self Care/Mgmt Minutes: 30                Time Calculation:   OT Start Time: 1200  OT Stop Time: 1230  OT Time Calculation (min): 30 min  Total Timed Code Minutes- OT: 30 minute(s)  Timed Charges  44065 - OT Self Care/Mgmt Minutes: 30  Total Minutes  Timed Charges Total Minutes: 30   Total Minutes:  30     Therapy Charges for Today     Code Description Service Date Service Provider Modifiers Qty    39826943889 HC OT SELF CARE/MGMT/TRAIN EA 15 MIN 11/29/2022 Sandra Boateng, REINIER GO 2                      REINIER Leger  11/29/2022

## 2023-01-05 ENCOUNTER — APPOINTMENT (OUTPATIENT)
Dept: OCCUPATIONAL THERAPY | Facility: HOSPITAL | Age: 78
End: 2023-01-05
Payer: MEDICARE

## 2023-01-09 ENCOUNTER — APPOINTMENT (OUTPATIENT)
Dept: OCCUPATIONAL THERAPY | Facility: HOSPITAL | Age: 78
End: 2023-01-09
Payer: MEDICARE

## 2023-01-19 ENCOUNTER — HOSPITAL ENCOUNTER (OUTPATIENT)
Dept: OCCUPATIONAL THERAPY | Facility: HOSPITAL | Age: 78
Setting detail: THERAPIES SERIES
Discharge: HOME | End: 2023-01-19
Payer: MEDICARE

## 2023-01-19 DIAGNOSIS — I89.0 LYMPHEDEMA OF UPPER EXTREMITY: Primary | ICD-10-CM

## 2023-01-19 DIAGNOSIS — C4A.61 MERKEL CELL CARCINOMA OF RIGHT UPPER EXTREMITY: ICD-10-CM

## 2023-01-19 PROCEDURE — 97535 SELF CARE MNGMENT TRAINING: CPT

## 2023-01-19 NOTE — THERAPY PROGRESS REPORT/RE-CERT
Outpatient Occupational Therapy Lymphedema Progress Note  [facility]     Patient Name: Matt Hopson  : 1945  MRN: [id number]  Today's Date: 2023      Visit Date: 2023    Patient Active Problem List   Diagnosis   • Cellulitis of right lower extremity   • Osteomyelitis (HCC)        Past Medical History:   Diagnosis Date   • Coronary artery disease    • Diabetes mellitus (HCC)    • GERD (gastroesophageal reflux disease)    • H/O kidney transplant    • Hyperlipidemia    • Renal failure    • Sleep apnea         Past Surgical History:   Procedure Laterality Date   • AMPUTATION DIGIT Right 2016    Procedure: RESECTION 2ND THROUGH 5TH METATARSAL;  Surgeon: Marin Castañeda DPM;  Location: [facility] OR;  Service:    • AMPUTATION FOOT / TOE      second toe on both feet   • ARTERIOVENOUS FISTULA      right arm, removed 10 years ago.   • BONE EXCISION LEG Right 2016    Procedure: RESECTION OF PARTIAL FIRST RAY RIGHT FOOT WITH SURGICAL WOUND CLOSURE;  Surgeon: Marin Castañeda DPM;  Location: [facility] OR;  Service:    • CORONARY ANGIOPLASTY WITH STENT PLACEMENT  2011   • HERNIA REPAIR      embilical   • JOINT REPLACEMENT  2000    hip,    • NASAL SINUS SURGERY     • PA DRAIN LOWER LEG DEEP ABSC/HEMATOMA Right 2016    Procedure: LOWER EXTREMITY DEBRIDEMENT-ulcer is on bottom of right foot;  Surgeon: Lexis Burgess DO;  Location: [facility] OR;  Service: General   • SHOULDER ROTATOR CUFF REPAIR     • TRANSPLANTATION RENAL  1990         Visit Dx:      ICD-10-CM ICD-9-CM   1. Lymphedema of upper extremity  I89.0 457.1   2. 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 yes  -RE      Pre-Treatment Pain Level 0  -RE      Post-Treatment Pain Level 0  -RE         Subjective Comments    Subjective Comments Patient reports that his edema has improved. He is not  currently wearing his glove. He does sleep with his arm elevated. He also reports the sleeve slipping. He was limited in sleeve use recently following a large skin tear on his forearm that occured while he had a medical procedure.  -RE         Posture/Observations    Posture/Observations Comments scabbed area on forearm. No redness or signs of infection  -RE         BUE Circumferential (cm)    Measurement Location 1 Axilla  -RE      Right 1 36.5 cm  -RE      Measurement Location 2 15 cm above elbow  -RE      Right 2 30 cm  -RE      Measurement Location 3 10 cm above elbow  -RE      Right 3 28.8 cm  -RE      Measurement Location 4 5 cm above elbow  -RE      Right 4 30 cm  -RE      Measurement Location 5 elbow  -RE      Right 5 28.8 cm  -RE      Measurement Location 6 5 cm below elbow  -RE      Right 6 28 cm  -RE      Measurement Location 7 10 cm below elbow  -RE      Right 7 27 cm  -RE      Measurement Location 8 15 cm below elbow  -RE      Right 8 22.8 cm  -RE      Measurement Location 9 20 cm below elbow  -RE      Right 9 19.5 cm  -RE      Measurement Location 10 Wrist  -RE      Right 10 18.8 cm  -RE      Measurement Location 11 Mid palm  -RE      Right 11 21.8 cm  -RE      RUE Circumferential Total 292 cm  -RE            User Key  (r) = Recorded By, (t) = Taken By, (c) = Cosigned By    Initials Name Provider Type    RE Sandra Boateng OTR Occupational Therapist                         OT Assessment/Plan     Row Name 01/19/23 0340          OT Assessment    Assessment Comments Goals updated. Edema in hand is resolved. Circumference measurements are little changed but minimal edema palpable except in proximal upper arm. Patient is having trouble with his sleeve slipping. I recommended either using a garment adhesive or trying a different sleeve. He prefers to use the glue. He is pleased with his progress.  -RE     OT Diagnosis R UE lymphedema  -RE     OT Rehab Potential Good  -RE     Patient/caregiver participated in  establishment of treatment plan and goals Yes  -RE     Patient would benefit from skilled therapy intervention Yes  -RE        OT Plan    Predicted Duration of Therapy Intervention (OT) follow up if symptoms worsen  -RE     Planned CPT's? OT THER PROC EA 15 MIN: [id number];OT SELF CARE/MGMT/TRAIN 15 MIN: 87366;OT MANUAL THERAPY EA 15 MIN: 22627  -RE     OT Plan Comments Follow up if symptoms worsen.  -RE           User Key  (r) = Recorded By, (t) = Taken By, (c) = Cosigned By    Initials Name Provider Type    Sandra Vasquez OTR Occupational Therapist                       OT Goals     Row Name 01/19/23 1000          OT Short Term Goals    STG Date to Achieve 02/02/23  -RE     STG 1 Patient will demonstrate proper awareness of “What is Lymphedema?” and “Healthy Habits” for improved prevention, management, care of symptoms and ease of transition to self-care of condition.  -RE     STG 1 Progress Met  -RE     STG 2 Patient will demonstrate decreased net edema of right upper extremity >/= 3-5 cm for decrease in symptoms, decreased risk of infection and improved skin care/transition to self-care of condition.  -RE     STG 2 Progress Not Met  -RE     STG 2 Progress Comments Hand edema has resolved and edema is palpable only in proximal upper arm. Circumferences are little changed.  -RE        Long Term Goals    LTG Date to Achieve 02/01/23  -RE     LTG 1 Patient independent and compliant with initial home exercise program focused on gentle AROM and stretching to improve AROM to pre-surgical level.  -RE     LTG 1 Progress Ongoing  -RE     LTG 2 Patient independent and compliant with compression garments and night compression as indicated for self-management of lymphedema.  -RE     LTG 2 Progress Met  -RE     LTG 3 Patient will demonstrate good understanding of post radiation skin care/massage.  -RE     LTG 3 Progress Met  -RE     LTG 4 Patient to improve LLIS score by 5 points in 4 weeks.  -RE     LTG 4 Progress Ongoing   -RE        Time Calculation    OT Goal Re-Cert Due Date 02/01/23  -RE           User Key  (r) = Recorded By, (t) = Taken By, (c) = Cosigned By    Initials Name Provider Type    Sandra Vasquez OTR Occupational Therapist                Therapy Education  Education Details: Discussed options to resolve sleeve slipping. He prefers to try garment adhesive first before getting a new sleeve. Gave ordering information. Recommended continued daily compression sleeve use.  Given: Symptoms/condition management, Other (comment)  Program: New, Reinforced  How Provided: Verbal  Provided to: Patient  Level of Understanding: Teach back education performed, Verbalized  85975 - OT Self Care/Mgmt Minutes: 20                Time Calculation:   OT Start Time: 0955  OT Stop Time: 1015  OT Time Calculation (min): 20 min  Total Timed Code Minutes- OT: 20 minute(s)  Timed Charges  15507 - OT Self Care/Mgmt Minutes: 20  Total Minutes  Timed Charges Total Minutes: 20   Total Minutes: 20     Therapy Charges for Today     Code Description Service Date Service Provider Modifiers Qty    13923999931  OT SELF CARE/MGMT/TRAIN EA 15 MIN 1/19/2023 Sandra Boateng OTR GO 1                      REINIER Leger  1/19/2023

## 2023-02-21 ENCOUNTER — LAB REQUISITION (OUTPATIENT)
Dept: LAB | Facility: HOSPITAL | Age: 78
End: 2023-02-21
Payer: MEDICARE

## 2023-02-21 DIAGNOSIS — R78.81 BACTEREMIA: ICD-10-CM

## 2023-02-21 DIAGNOSIS — R10.33 PERIUMBILICAL PAIN: ICD-10-CM

## 2023-02-21 LAB
ALBUMIN SERPL-MCNC: 3.1 G/DL (ref 3.5–5.2)
ALBUMIN/GLOB SERPL: 0.9 G/DL
ALP SERPL-CCNC: 84 U/L (ref 39–117)
ALT SERPL W P-5'-P-CCNC: 23 U/L (ref 1–41)
ANION GAP SERPL CALCULATED.3IONS-SCNC: 8 MMOL/L (ref 5–15)
AST SERPL-CCNC: 26 U/L (ref 1–40)
BASOPHILS # BLD AUTO: 0.04 10*3/MM3 (ref 0–0.2)
BASOPHILS NFR BLD AUTO: 0.8 % (ref 0–1.5)
BILIRUB SERPL-MCNC: 0.7 MG/DL (ref 0–1.2)
BUN SERPL-MCNC: 23 MG/DL (ref 8–23)
BUN/CREAT SERPL: 16.9 (ref 7–25)
CALCIUM SPEC-SCNC: 8.8 MG/DL (ref 8.6–10.5)
CHLORIDE SERPL-SCNC: 103 MMOL/L (ref 98–107)
CO2 SERPL-SCNC: 25 MMOL/L (ref 22–29)
CREAT SERPL-MCNC: 1.36 MG/DL (ref 0.76–1.27)
CRP SERPL-MCNC: 5.95 MG/DL (ref 0–0.5)
DEPRECATED RDW RBC AUTO: 46.3 FL (ref 37–54)
EGFRCR SERPLBLD CKD-EPI 2021: 53.3 ML/MIN/1.73
EOSINOPHIL # BLD AUTO: 0.08 10*3/MM3 (ref 0–0.4)
EOSINOPHIL NFR BLD AUTO: 1.5 % (ref 0.3–6.2)
ERYTHROCYTE [DISTWIDTH] IN BLOOD BY AUTOMATED COUNT: 13.7 % (ref 12.3–15.4)
ERYTHROCYTE [SEDIMENTATION RATE] IN BLOOD: 62 MM/HR (ref 0–20)
GLOBULIN UR ELPH-MCNC: 3.4 GM/DL
GLUCOSE SERPL-MCNC: 82 MG/DL (ref 65–99)
HCT VFR BLD AUTO: 35 % (ref 37.5–51)
HGB BLD-MCNC: 11.4 G/DL (ref 13–17.7)
IMM GRANULOCYTES # BLD AUTO: 0.03 10*3/MM3 (ref 0–0.05)
IMM GRANULOCYTES NFR BLD AUTO: 0.6 % (ref 0–0.5)
LYMPHOCYTES # BLD AUTO: 0.64 10*3/MM3 (ref 0.7–3.1)
LYMPHOCYTES NFR BLD AUTO: 12.3 % (ref 19.6–45.3)
MCH RBC QN AUTO: 30.3 PG (ref 26.6–33)
MCHC RBC AUTO-ENTMCNC: 32.6 G/DL (ref 31.5–35.7)
MCV RBC AUTO: 93.1 FL (ref 79–97)
MONOCYTES # BLD AUTO: 0.29 10*3/MM3 (ref 0.1–0.9)
MONOCYTES NFR BLD AUTO: 5.6 % (ref 5–12)
NEUTROPHILS NFR BLD AUTO: 4.12 10*3/MM3 (ref 1.7–7)
NEUTROPHILS NFR BLD AUTO: 79.2 % (ref 42.7–76)
NRBC BLD AUTO-RTO: 0 /100 WBC (ref 0–0.2)
PLATELET # BLD AUTO: 151 10*3/MM3 (ref 140–450)
PMV BLD AUTO: 11.9 FL (ref 6–12)
POTASSIUM SERPL-SCNC: 4.1 MMOL/L (ref 3.5–5.2)
PROT SERPL-MCNC: 6.5 G/DL (ref 6–8.5)
RBC # BLD AUTO: 3.76 10*6/MM3 (ref 4.14–5.8)
SODIUM SERPL-SCNC: 136 MMOL/L (ref 136–145)
WBC NRBC COR # BLD: 5.2 10*3/MM3 (ref 3.4–10.8)

## 2023-02-21 PROCEDURE — 86140 C-REACTIVE PROTEIN: CPT | Performed by: INTERNAL MEDICINE

## 2023-02-21 PROCEDURE — 85025 COMPLETE CBC W/AUTO DIFF WBC: CPT | Performed by: INTERNAL MEDICINE

## 2023-02-21 PROCEDURE — 80053 COMPREHEN METABOLIC PANEL: CPT | Performed by: INTERNAL MEDICINE

## 2023-02-21 PROCEDURE — 85652 RBC SED RATE AUTOMATED: CPT | Performed by: INTERNAL MEDICINE

## 2023-02-27 ENCOUNTER — LAB REQUISITION (OUTPATIENT)
Dept: LAB | Facility: HOSPITAL | Age: 78
End: 2023-02-27
Payer: MEDICARE

## 2023-02-27 DIAGNOSIS — I12.9 HYPERTENSIVE CHRONIC KIDNEY DISEASE WITH STAGE 1 THROUGH STAGE 4 CHRONIC KIDNEY DISEASE, OR UNSPECIFIED CHRONIC KIDNEY DISEASE: ICD-10-CM

## 2023-02-27 DIAGNOSIS — R78.81 BACTEREMIA: ICD-10-CM

## 2023-02-27 LAB
ALBUMIN SERPL-MCNC: 3.2 G/DL (ref 3.5–5.2)
ALBUMIN/GLOB SERPL: 1 G/DL
ALP SERPL-CCNC: 74 U/L (ref 39–117)
ALT SERPL W P-5'-P-CCNC: 20 U/L (ref 1–41)
ANION GAP SERPL CALCULATED.3IONS-SCNC: 10.8 MMOL/L (ref 5–15)
AST SERPL-CCNC: 29 U/L (ref 1–40)
BASOPHILS # BLD AUTO: 0.05 10*3/MM3 (ref 0–0.2)
BASOPHILS NFR BLD AUTO: 0.8 % (ref 0–1.5)
BILIRUB SERPL-MCNC: 0.5 MG/DL (ref 0–1.2)
BUN SERPL-MCNC: 30 MG/DL (ref 8–23)
BUN/CREAT SERPL: 17.8 (ref 7–25)
CALCIUM SPEC-SCNC: 8.5 MG/DL (ref 8.6–10.5)
CHLORIDE SERPL-SCNC: 99 MMOL/L (ref 98–107)
CO2 SERPL-SCNC: 26.2 MMOL/L (ref 22–29)
CREAT SERPL-MCNC: 1.69 MG/DL (ref 0.76–1.27)
CRP SERPL-MCNC: 0.85 MG/DL (ref 0–0.5)
DEPRECATED RDW RBC AUTO: 45.9 FL (ref 37–54)
EGFRCR SERPLBLD CKD-EPI 2021: 41 ML/MIN/1.73
EOSINOPHIL # BLD AUTO: 0.02 10*3/MM3 (ref 0–0.4)
EOSINOPHIL NFR BLD AUTO: 0.3 % (ref 0.3–6.2)
ERYTHROCYTE [DISTWIDTH] IN BLOOD BY AUTOMATED COUNT: 13.6 % (ref 12.3–15.4)
ERYTHROCYTE [SEDIMENTATION RATE] IN BLOOD: 58 MM/HR (ref 0–20)
GLOBULIN UR ELPH-MCNC: 3.3 GM/DL
GLUCOSE SERPL-MCNC: 139 MG/DL (ref 65–99)
HCT VFR BLD AUTO: 35.9 % (ref 37.5–51)
HGB BLD-MCNC: 11.5 G/DL (ref 13–17.7)
LYMPHOCYTES # BLD AUTO: 0.46 10*3/MM3 (ref 0.7–3.1)
LYMPHOCYTES NFR BLD AUTO: 7.5 % (ref 19.6–45.3)
MCH RBC QN AUTO: 30.1 PG (ref 26.6–33)
MCHC RBC AUTO-ENTMCNC: 32 G/DL (ref 31.5–35.7)
MCV RBC AUTO: 94 FL (ref 79–97)
MONOCYTES # BLD AUTO: 0.3 10*3/MM3 (ref 0.1–0.9)
MONOCYTES NFR BLD AUTO: 4.9 % (ref 5–12)
NEUTROPHILS NFR BLD AUTO: 5.29 10*3/MM3 (ref 1.7–7)
NEUTROPHILS NFR BLD AUTO: 86.2 % (ref 42.7–76)
PLATELET # BLD AUTO: 104 10*3/MM3 (ref 140–450)
PMV BLD AUTO: 11.7 FL (ref 6–12)
POTASSIUM SERPL-SCNC: 4.3 MMOL/L (ref 3.5–5.2)
PROT SERPL-MCNC: 6.5 G/DL (ref 6–8.5)
RBC # BLD AUTO: 3.82 10*6/MM3 (ref 4.14–5.8)
SODIUM SERPL-SCNC: 136 MMOL/L (ref 136–145)
WBC NRBC COR # BLD: 6.14 10*3/MM3 (ref 3.4–10.8)

## 2023-02-27 PROCEDURE — 86140 C-REACTIVE PROTEIN: CPT | Performed by: INTERNAL MEDICINE

## 2023-02-27 PROCEDURE — 85027 COMPLETE CBC AUTOMATED: CPT | Performed by: INTERNAL MEDICINE

## 2023-02-27 PROCEDURE — 80053 COMPREHEN METABOLIC PANEL: CPT | Performed by: INTERNAL MEDICINE

## 2023-02-27 PROCEDURE — 85652 RBC SED RATE AUTOMATED: CPT | Performed by: INTERNAL MEDICINE

## 2023-07-24 ENCOUNTER — HOSPITAL ENCOUNTER (OUTPATIENT)
Dept: OCCUPATIONAL THERAPY | Facility: HOSPITAL | Age: 78
Discharge: HOME | End: 2023-07-24
Admitting: SURGERY
Payer: MEDICARE

## 2023-07-24 DIAGNOSIS — I89.0 LYMPHEDEMA OF UPPER EXTREMITY: Primary | ICD-10-CM

## 2023-07-24 PROCEDURE — 97168 OT RE-EVAL EST PLAN CARE: CPT

## 2023-07-24 NOTE — THERAPY RE-EVALUATION
Outpatient Occupational Therapy Lymphedema Re-Evaluation  [facility]     Patient Name: Matt Hopson  : 1945  MRN: [id number]  Today's Date: 2023      Visit Date: 2023    Patient Active Problem List   Diagnosis    Cellulitis of right lower extremity    Osteomyelitis        Past Medical History:   Diagnosis Date    Coronary artery disease     Diabetes mellitus     GERD (gastroesophageal reflux disease)     H/O kidney transplant     Hyperlipidemia     Renal failure     Sleep apnea         Past Surgical History:   Procedure Laterality Date    AMPUTATION DIGIT Right 2016    Procedure: RESECTION 2ND THROUGH 5TH METATARSAL;  Surgeon: Marin Castañeda DPM;  Location: [facility] OR;  Service:     AMPUTATION FOOT / TOE      second toe on both feet    ARTERIOVENOUS FISTULA      right arm, removed 10 years ago.    BONE EXCISION LEG Right 2016    Procedure: RESECTION OF PARTIAL FIRST RAY RIGHT FOOT WITH SURGICAL WOUND CLOSURE;  Surgeon: Marin Castañeda DPM;  Location: [facility] OR;  Service:     CORONARY ANGIOPLASTY WITH STENT PLACEMENT  2011    HERNIA REPAIR      embilical    JOINT REPLACEMENT  2000    hip,     NASAL SINUS SURGERY      CA DRAIN LOWER LEG DEEP ABSC/HEMATOMA Right 2016    Procedure: LOWER EXTREMITY DEBRIDEMENT-ulcer is on bottom of right foot;  Surgeon: Lexis Burgess DO;  Location: [facility] OR;  Service: General    SHOULDER ROTATOR CUFF REPAIR      TRANSPLANTATION RENAL  1990         Visit Dx:     ICD-10-CM ICD-9-CM   1. Lymphedema of upper extremity  I89.0 457.1            Lymphedema       Row Name 23 0900             Subjective Pain    Able to rate subjective pain? yes  -RE      Pre-Treatment Pain Level 0  -RE      Post-Treatment Pain Level 0  -RE      Subjective Pain Comment did have some chest ad upper back pain due to trunk edema  -RE         Subjective Comments    Subjective Comments Patient reports an increase in edema which started about 6 weeks  ago.  The increased edema was primarily in his chest and upper back.  He reports that it was initially painful.  He further reports that the edema and the pain have mostly resolved.  He has no more pain and minor swelling.  -RE         Lymphedema Measurements    Measurement Type(s) Circumferential  -RE      Circumferential Areas Upper extremities  -RE         BUE Circumferential (cm)    Measurement Location 1 Axilla  -RE      Right 1 37.5 cm  -RE      Measurement Location 2 15 cm above elbow  -RE      Right 2 31 cm  -RE      Measurement Location 3 10 cm above elbow  -RE      Right 3 30 cm  -RE      Measurement Location 4 5 cm above elbow  -RE      Right 4 30 cm  -RE      Measurement Location 5 elbow  -RE      Right 5 30.5 cm  -RE      Measurement Location 6 5 cm below elbow  -RE      Right 6 30.5 cm  -RE      Measurement Location 7 10 cm below elbow  -RE      Right 7 30.5 cm  -RE      Measurement Location 8 15 cm below elbow  -RE      Right 8 25 cm  -RE      Measurement Location 9 20 cm below elbow  -RE      Right 9 21 cm  -RE      Measurement Location 10 Wrist  -RE      Right 10 20 cm  -RE      Measurement Location 11 Mid palm  -RE      Right 11 22 cm  -RE      RUE Circumferential Total 308 cm  -RE         Compression/Skin Care    Compression/Skin Care Comments Patient reports that he has been a little inconsistent with wearing his compression sleeves.  He also reports some difficulty with the sleeves rolling.  Currently he ordered over the shoulder garments with a strap.  He ended up cutting the strap off.  He is wearing a compression sleeve and glove.  Neither of these products appear to be [employer] products which is what I had originally recommended to him.  -RE                User Key  (r) = Recorded By, (t) = Taken By, (c) = Cosigned By      Initials Name Provider Type    Sandra Vasquez OTR Occupational Therapist                            Therapy Education  Education Details: We discussed causes for truncal  edema.  I explained treatment options including complete decongestive therapy 3 times a week for 4 weeks, bandaging only at night, or continuing with compression garment use and adding a compression shirt.  I also recommended that he order new compression sleeves.  I recommended the use of a compression shirt at night and /or during the daytime to address truncal edema.  Given: Edema management, Symptoms/condition management  Program: New  How Provided: Verbal  Provided to: Patient  Level of Understanding: Teach back education performed, Verbalized         OT Goals       Row Name 07/24/23 1000          OT Short Term Goals    STG Date to Achieve 08/24/23  -RE     STG 1 Patient will demonstrate proper awareness of “What is Lymphedema?” and “Healthy Habits” for improved prevention, management, care of symptoms and ease of transition to self-care of condition.  -RE     STG 1 Progress Met  -RE     STG 2 Patient will demonstrate decreased net edema of right upper extremity >/= 3-5 cm for decrease in symptoms, decreased risk of infection and improved skin care/transition to self-care of condition.  -RE     STG 2 Progress New  -RE     STG 2 Progress Comments Net edema decrease will be measured from his total circumference that was measured on 7/24/2023.  -RE        Long Term Goals    LTG Date to Achieve 10/24/23  -RE     LTG 2 Patient independent and compliant with compression garments and night compression as indicated for self-management of lymphedema.  -RE     LTG 2 Progress New  -RE        Time Calculation    OT Goal Re-Cert Due Date 10/24/23  -RE               User Key  (r) = Recorded By, (t) = Taken By, (c) = Cosigned By      Initials Name Provider Type    Sandra Vasquez OTR Occupational Therapist                     OT Assessment/Plan       Row Name 07/24/23 1020          OT Assessment    Assessment Comments Patient returns today for reevaluation of his right upper extremity lymphedema.  Approximately 6 weeks ago  he noted increased chest and upper back lymphedema with accompanying pain.  Mild nonpitting edema is present in the upper chest on the right side.  Minimal edema in the upper back.  Edema in the trunk is no longer painful.  Circumferential measurements of the right upper extremity indicate increased edema.  Patient reports that he has not been consistent with compression garment use and this may explain the increased edema in his arm and possibly the increase in the chest as well.  After some discussion he prefers to purchase new compression sleeves and wear them more consistently rather than starting complete decongestive therapy.  Multiple therapy appointments per week would be difficult for him.  I will follow-up with him in a month to reassess for edema reduction and make recommendations based on progress.  -RE     Please refer to paper survey for additional self-reported information No  -RE     OT Diagnosis Right upper extremity lymphedema  -RE     OT Rehab Potential Good  -RE     Patient/caregiver participated in establishment of treatment plan and goals Yes  -RE     Patient would benefit from skilled therapy intervention Yes  -RE        OT Plan    OT Frequency Other (comment)  -RE     Predicted Duration of Therapy Intervention (OT) Follow-up in 1 month.  If symptoms have not improved we will make further treatment recommendations  -RE     Planned CPT's? OT RE-EVAL: 89553;OT SELF CARE/MGMT/TRAIN 15 MIN: 99269;OT THER PROC EA 15 MIN: [id number];OT MANUAL THERAPY EA 15 MIN: 13800  -RE     OT Plan Comments Follow-up in 1 month  -RE               User Key  (r) = Recorded By, (t) = Taken By, (c) = Cosigned By      Initials Name Provider Type    Sandra Vasquez OTR Occupational Therapist                              Time Calculation:   OT Start Time: 0925  OT Stop Time: 1000  OT Time Calculation (min): 35 min  Untimed Charges  OT Eval/Re-eval Minutes: 35  Total Minutes  Untimed Charges Total Minutes: 35   Total  Minutes: 35     Therapy Charges for Today       Code Description Service Date Service Provider Modifiers Qty    [id number]  OT RE-EVAL 2 7/24/2023 Sandra Boateng, REINIER GO 1                      REINIER Leger  7/24/2023

## 2023-12-04 ENCOUNTER — HOSPITAL ENCOUNTER (OUTPATIENT)
Dept: INFUSION THERAPY | Facility: HOSPITAL | Age: 78
Discharge: HOME | End: 2023-12-04
Admitting: NURSE PRACTITIONER
Payer: MEDICARE

## 2023-12-04 VITALS
DIASTOLIC BLOOD PRESSURE: 68 MMHG | SYSTOLIC BLOOD PRESSURE: 117 MMHG | RESPIRATION RATE: 18 BRPM | TEMPERATURE: 97.2 F | OXYGEN SATURATION: 99 % | HEART RATE: 89 BPM

## 2023-12-04 DIAGNOSIS — M86.00 ACUTE HEMATOGENOUS OSTEOMYELITIS, UNSPECIFIED SITE: Primary | ICD-10-CM

## 2023-12-04 LAB
APTT PPP: 35.7 SECONDS (ref 24.3–38.1)
BASOPHILS # BLD AUTO: 0.04 10*3/MM3 (ref 0–0.2)
BASOPHILS NFR BLD AUTO: 0.9 % (ref 0–1.5)
DEPRECATED RDW RBC AUTO: 57 FL (ref 37–54)
EOSINOPHIL # BLD AUTO: 0.05 10*3/MM3 (ref 0–0.4)
EOSINOPHIL NFR BLD AUTO: 1.1 % (ref 0.3–6.2)
ERYTHROCYTE [DISTWIDTH] IN BLOOD BY AUTOMATED COUNT: 15.8 % (ref 12.3–15.4)
HCT VFR BLD AUTO: 36.4 % (ref 37.5–51)
HGB BLD-MCNC: 11.2 G/DL (ref 13–17.7)
IMM GRANULOCYTES # BLD AUTO: 0.01 10*3/MM3 (ref 0–0.05)
IMM GRANULOCYTES NFR BLD AUTO: 0.2 % (ref 0–0.5)
INR PPP: 1.19 (ref 0.9–1.1)
LYMPHOCYTES # BLD AUTO: 0.68 10*3/MM3 (ref 0.7–3.1)
LYMPHOCYTES NFR BLD AUTO: 14.6 % (ref 19.6–45.3)
MCH RBC QN AUTO: 29.8 PG (ref 26.6–33)
MCHC RBC AUTO-ENTMCNC: 30.8 G/DL (ref 31.5–35.7)
MCV RBC AUTO: 96.8 FL (ref 79–97)
MONOCYTES # BLD AUTO: 0.24 10*3/MM3 (ref 0.1–0.9)
MONOCYTES NFR BLD AUTO: 5.2 % (ref 5–12)
NEUTROPHILS NFR BLD AUTO: 3.63 10*3/MM3 (ref 1.7–7)
NEUTROPHILS NFR BLD AUTO: 78 % (ref 42.7–76)
NRBC BLD AUTO-RTO: 0 /100 WBC (ref 0–0.2)
PLATELET # BLD AUTO: 99 10*3/MM3 (ref 140–450)
PMV BLD AUTO: 10.5 FL (ref 6–12)
PROTHROMBIN TIME: 15.1 SECONDS (ref 12.1–15)
RBC # BLD AUTO: 3.76 10*6/MM3 (ref 4.14–5.8)
WBC NRBC COR # BLD AUTO: 4.65 10*3/MM3 (ref 3.4–10.8)

## 2023-12-04 PROCEDURE — 85610 PROTHROMBIN TIME: CPT | Performed by: NURSE PRACTITIONER

## 2023-12-04 PROCEDURE — 85025 COMPLETE CBC W/AUTO DIFF WBC: CPT | Performed by: NURSE PRACTITIONER

## 2023-12-04 PROCEDURE — C1751 CATH, INF, PER/CENT/MIDLINE: HCPCS

## 2023-12-04 PROCEDURE — 36410 VNPNXR 3YR/> PHY/QHP DX/THER: CPT

## 2023-12-04 PROCEDURE — 85730 THROMBOPLASTIN TIME PARTIAL: CPT | Performed by: NURSE PRACTITIONER

## 2023-12-04 RX ORDER — LEVOTHYROXINE SODIUM 88 UG/1
88 TABLET ORAL DAILY
COMMUNITY

## 2023-12-04 NOTE — PATIENT INSTRUCTIONS
"  Call  Dr. Barry Guerrero @ [phone]  if you have any problems or concerns.    We know you have a Choice in healthcare and appreciate you using [facility].  Our purpose is to provide you \"Excellent Care\".  We hope that you will always choose us in the future and continue to recommend us to your family and friends.              "

## 2024-02-01 ENCOUNTER — LAB REQUISITION (OUTPATIENT)
Dept: LAB | Facility: HOSPITAL | Age: 79
End: 2024-02-01
Payer: MEDICARE

## 2024-02-01 DIAGNOSIS — N40.1 BENIGN PROSTATIC HYPERPLASIA WITH LOWER URINARY TRACT SYMPTOMS: ICD-10-CM

## 2024-02-01 PROCEDURE — 88305 TISSUE EXAM BY PATHOLOGIST: CPT | Performed by: UROLOGY

## 2024-02-02 LAB
LAB AP CASE REPORT: NORMAL
PATH REPORT.FINAL DX SPEC: NORMAL
PATH REPORT.GROSS SPEC: NORMAL